# Patient Record
Sex: FEMALE | Race: WHITE
[De-identification: names, ages, dates, MRNs, and addresses within clinical notes are randomized per-mention and may not be internally consistent; named-entity substitution may affect disease eponyms.]

---

## 2022-11-08 ENCOUNTER — HOSPITAL ENCOUNTER (OUTPATIENT)
Age: 82
End: 2022-11-08
Payer: MEDICARE

## 2022-11-08 DIAGNOSIS — N95.0: Primary | ICD-10-CM

## 2022-11-08 PROCEDURE — 76830 TRANSVAGINAL US NON-OB: CPT

## 2023-05-02 RX ORDER — CARVEDILOL 6.25 MG/1
6.25 TABLET ORAL 2 TIMES DAILY WITH MEALS
Qty: 180 TABLET | Refills: 1 | Status: SHIPPED | OUTPATIENT
Start: 2023-05-02 | End: 2023-05-02 | Stop reason: SDUPTHER

## 2023-05-02 RX ORDER — CARVEDILOL 6.25 MG/1
6.25 TABLET ORAL 2 TIMES DAILY WITH MEALS
COMMUNITY
End: 2023-05-02 | Stop reason: SDUPTHER

## 2023-05-02 RX ORDER — CARVEDILOL 6.25 MG/1
6.25 TABLET ORAL 2 TIMES DAILY WITH MEALS
Qty: 180 TABLET | Refills: 1 | Status: SHIPPED | OUTPATIENT
Start: 2023-05-02

## 2024-05-02 ENCOUNTER — OFFICE VISIT (OUTPATIENT)
Dept: CARDIOLOGY | Facility: CLINIC | Age: 84
End: 2024-05-02
Payer: MEDICARE

## 2024-05-02 VITALS
DIASTOLIC BLOOD PRESSURE: 68 MMHG | SYSTOLIC BLOOD PRESSURE: 132 MMHG | BODY MASS INDEX: 19.81 KG/M2 | HEIGHT: 64 IN | OXYGEN SATURATION: 97 % | WEIGHT: 116 LBS | HEART RATE: 80 BPM

## 2024-05-02 DIAGNOSIS — R94.31 ABNORMAL ECG: ICD-10-CM

## 2024-05-02 DIAGNOSIS — I50.21 ACUTE SYSTOLIC CHF (CONGESTIVE HEART FAILURE): Primary | Chronic | ICD-10-CM

## 2024-05-02 DIAGNOSIS — I34.9 NONRHEUMATIC MITRAL VALVE DISORDER: Chronic | ICD-10-CM

## 2024-05-02 RX ORDER — ROSUVASTATIN CALCIUM 10 MG/1
10 TABLET, COATED ORAL DAILY
COMMUNITY

## 2024-05-02 RX ORDER — CARVEDILOL 12.5 MG/1
1 TABLET ORAL EVERY 12 HOURS SCHEDULED
COMMUNITY
Start: 2024-04-27

## 2024-05-02 RX ORDER — ALPRAZOLAM 0.5 MG/1
TABLET ORAL
COMMUNITY

## 2024-05-02 RX ORDER — MULTIPLE VITAMINS W/ MINERALS TAB 9MG-400MCG
1 TAB ORAL DAILY
COMMUNITY

## 2024-05-02 RX ORDER — HYDROCORTISONE 10 MG/1
TABLET ORAL
COMMUNITY
Start: 2024-04-27

## 2024-05-02 RX ORDER — HYDROXYZINE 50 MG/1
1 TABLET, FILM COATED ORAL EVERY 12 HOURS SCHEDULED
COMMUNITY
Start: 2024-02-26

## 2024-05-02 RX ORDER — METAPROTERENOL SULFATE 10 MG
500 TABLET ORAL DAILY
COMMUNITY

## 2024-05-02 RX ORDER — LISINOPRIL 2.5 MG/1
2.5 TABLET ORAL DAILY
COMMUNITY

## 2024-05-02 RX ORDER — FLUDROCORTISONE ACETATE 0.1 MG/1
1 TABLET ORAL DAILY
COMMUNITY
Start: 2024-04-05

## 2024-05-02 RX ORDER — BUSPIRONE HYDROCHLORIDE 5 MG/1
TABLET ORAL 2 TIMES DAILY
COMMUNITY

## 2024-05-02 NOTE — ASSESSMENT & PLAN NOTE
History of Systolic heart failure with LV EF 45-50% on ECHO 7/14/22.  Dr. Dorantes at [facility] PET scan showed possible worsening heart failure.  Repeat ECHO to follow up.

## 2024-05-02 NOTE — PROGRESS NOTES
[facility] Provider Note     Date:   2024  Name: Osiris Moody  :   1940  PCP: Sean Acevedo MD    Chief Complaint   Patient presents with   • Congestive Heart Failure       Subjective     History of Present Illness  Osiris Moody is a 84 y.o. female who presents today for CHF.    Patient states she does not remember why she is here. She was last seen by Dr. Moreno 2022. Her daughter is now helping her to remember her appointments.  She has been living with her grandson and her great grandson.  She was seen at [facility] after having a PET scan revealing that she may have worsening heart failure. She had her last radiation treatment for uterine cancer 23.  Patient states that she feels like the radiation affected her memory.  Her PCP has been refilling her medications.  She denies any exertional chest pain or shortness of breath.  Patient has a history of palpitations and irregular heartbeat.  We will plan to repeat an echo to check for her heart failure and a nuclear stress test.  Patient is unable to walk the treadmill due to age and muscle weakness.    Cardiac History:    ECHO 22 left ventricular ejection fraction estimated 45 to 50%.  Mild mitral valve regurgitation.  Mild aortic valve regurgitation.    Stress ECHO 21 low risk     Reports Denies   Chest Pain [] [x]   Shortness of Air [] [x]   Palpitations [] [x]   Edema [] [x]   Dizziness [] [x]   Syncope [] [x]       Allergies   Allergen Reactions   • Hydromorphone Other (See Comments)     Other reaction(s): Other - please document in the comment field   Pt's daughter reports pt experienced confusion and agitation.   **tolerates oxycodone 22**    Pt's daughter reports pt experienced confusion and agitation.   **tolerates oxycodone 22**   • Meperidine Unknown (See Comments)     Other reaction(s): Unknown - Patient states they do not know rxn details   • Morphine Unknown (See  Comments)     Other reaction(s): Unknown - Patient states they do not know rxn details   Derivatives   **tolerates oxycodone 12/19/22**    Derivatives   **tolerates oxycodone 12/19/22**       Current Outpatient Medications:   •  ALPRAZolam (XANAX) 0.5 MG tablet, TAKE 1/2 TO 1 (ONE-HALF TO ONE) TABLET BY MOUTH ONCE DAILY AS NEEDED FOR SEVERE ANXIETY, Disp: , Rfl:   •  busPIRone (BUSPAR) 5 MG tablet, Take  by mouth 2 (Two) Times a Day., Disp: , Rfl:   •  carvedilol (COREG) 12.5 MG tablet, Take 1 tablet by mouth Every 12 (Twelve) Hours., Disp: , Rfl:   •  escitalopram (LEXAPRO) 20 MG tablet, Take 1 tablet by mouth once daily, Disp: 30 tablet, Rfl: 0  •  fludrocortisone 0.1 MG tablet, Take 1 tablet by mouth Daily., Disp: , Rfl:   •  hydrocortisone (CORTEF) 10 MG tablet, TAKE 2 TABLETS BY MOUTH IN THE MORNING AND 1/2 (ONE-HALF) IN THE EVENING (INCREASE AS NEEDED FOR ILLNESS), Disp: , Rfl:   •  hydrOXYzine (ATARAX) 50 MG tablet, Take 1 tablet by mouth Every 12 (Twelve) Hours., Disp: , Rfl:   •  lisinopril (PRINIVIL,ZESTRIL) 2.5 MG tablet, Take 1 tablet by mouth Daily., Disp: , Rfl:   •  multivitamin with minerals tablet tablet, Take 1 tablet by mouth Daily., Disp: , Rfl:   •  Omega-3 Fatty Acids (Omega-3 Fish Oil) 500 MG capsule, Take 1 capsule by mouth Daily., Disp: , Rfl:   •  rosuvastatin (CRESTOR) 10 MG tablet, Take 1 tablet by mouth Daily., Disp: , Rfl:     Past Medical History:   Diagnosis Date   • Hector's disease    • Diverticulosis    • Fibrocystic breast disease    • Hyperlipidemia    • Hypertension       Past Surgical History:   Procedure Laterality Date   • COLON SURGERY       Family History   Problem Relation Age of Onset   • Brain cancer Sister    • Cancer Brother      Social History     Socioeconomic History   • Marital status:    Tobacco Use   • Smoking status: Never     Passive exposure: Never   • Smokeless tobacco: Never   Vaping Use   • Vaping status: Never Used   Substance and Sexual Activity  "  • Alcohol use: Never   • Drug use: Never   • Sexual activity: Defer       Objective     Vital Signs:  /68   Pulse 80   Ht 162.6 cm (64\")   Wt 52.6 kg (116 lb)   SpO2 97%   BMI 19.91 kg/m²   Estimated body mass index is 19.91 kg/m² as calculated from the following:    Height as of this encounter: 162.6 cm (64\").    Weight as of this encounter: 52.6 kg (116 lb).       BMI is within normal parameters. No other follow-up for BMI required.      Physical Exam  Constitutional:       Appearance: Normal appearance.   Cardiovascular:      Rate and Rhythm: Normal rate. Rhythm regularly irregular.      Pulses: Normal pulses.      Heart sounds: Normal heart sounds.   Pulmonary:      Effort: Pulmonary effort is normal.      Breath sounds: Normal breath sounds.   Musculoskeletal:         General: Normal range of motion.      Right lower leg: No edema.      Left lower leg: No edema.   Skin:     General: Skin is warm and dry.      Capillary Refill: Capillary refill takes less than 2 seconds.   Neurological:      General: No focal deficit present.      Mental Status: She is alert and oriented to person, place, and time.   Psychiatric:         Mood and Affect: Mood normal.         Behavior: Behavior normal.         The following data was reviewed by: JUSTEN Nava on 05/02/2024:   Labs CBC and CMP have been reviewed.  Last OV note 3/27/24 Dr. Sahra Dorantes [facility] has been reviewed.  7/14/22 Last OV note Dr. Moreno was reviewed.   ECHO 7/14/22 and Stress ECHO  8/23/21 Reviewed.      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 Acute systolic CHF (congestive heart failure) (Primary)  Assessment & Plan:  History of Systolic heart failure with LV EF 45-50% on ECHO 7/14/22.  Dr. Dorantes at [facility] PET scan showed possible worsening heart failure.  Repeat ECHO to follow up.        Orders:  -     ECG 12 Lead  -     Adult Transthoracic Echo Complete W/ Cont if Necessary Per Protocol; Future  -     Stress Test With Myocardial Perfusion One Day; Future    2. 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  Will plan to repeat ECHO and Nuclear stress test.    Orders:  -     Stress Test With Myocardial Perfusion One Day; Future    3. Nonrheumatic mitral valve disorder  Assessment & Plan:  Mild Mitral Valve on ECHO in 2022. Plan to recheck ECHO.    Orders:  -     Adult Transthoracic Echo Complete W/ Cont if Necessary Per Protocol; Future        Recommendations: ER if symptoms increase, Report if any new/changing symptoms immediately, and Limit salt          Follow Up  Return in about 4 weeks (around 5/30/2024) for CHF.  Patient was given instructions and counseling regarding her condition or for health maintenance advice. Please see specific information pulled into the AVS if appropriate.

## 2024-05-02 NOTE — ASSESSMENT & PLAN NOTE
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  Will plan to repeat ECHO and Nuclear stress test.

## 2024-05-16 ENCOUNTER — OUTSIDE FACILITY SERVICE (OUTPATIENT)
Dept: CARDIOLOGY | Facility: CLINIC | Age: 84
End: 2024-05-16
Payer: MEDICARE

## 2024-05-17 ENCOUNTER — HOSPITAL ENCOUNTER (OUTPATIENT)
Dept: CARDIOLOGY | Facility: HOSPITAL | Age: 84
Discharge: HOME | End: 2024-05-17

## 2024-05-17 DIAGNOSIS — I50.21 ACUTE SYSTOLIC CHF (CONGESTIVE HEART FAILURE): ICD-10-CM

## 2024-05-17 DIAGNOSIS — R94.31 ABNORMAL ECG: ICD-10-CM

## 2024-05-21 ENCOUNTER — TELEPHONE (OUTPATIENT)
Dept: CARDIOLOGY | Facility: CLINIC | Age: 84
End: 2024-05-21
Payer: MEDICARE

## 2024-06-06 ENCOUNTER — OFFICE VISIT (OUTPATIENT)
Dept: CARDIOLOGY | Facility: CLINIC | Age: 84
End: 2024-06-06
Payer: MEDICARE

## 2024-06-06 VITALS
DIASTOLIC BLOOD PRESSURE: 74 MMHG | SYSTOLIC BLOOD PRESSURE: 138 MMHG | HEART RATE: 80 BPM | HEIGHT: 64 IN | WEIGHT: 116 LBS | OXYGEN SATURATION: 97 % | BODY MASS INDEX: 19.81 KG/M2

## 2024-06-06 DIAGNOSIS — R94.39 ABNORMAL NUCLEAR STRESS TEST: Primary | ICD-10-CM

## 2024-06-06 DIAGNOSIS — I50.22 CHRONIC SYSTOLIC HEART FAILURE: ICD-10-CM

## 2024-06-06 PROCEDURE — 99214 OFFICE O/P EST MOD 30 MIN: CPT | Performed by: NURSE PRACTITIONER

## 2024-06-06 PROCEDURE — 1159F MED LIST DOCD IN RCRD: CPT | Performed by: NURSE PRACTITIONER

## 2024-06-06 PROCEDURE — 1160F RVW MEDS BY RX/DR IN RCRD: CPT | Performed by: NURSE PRACTITIONER

## 2024-06-06 NOTE — ASSESSMENT & PLAN NOTE
She completed an echocardiogram today that revealed an LVEF of 41-45%, grade 1A diastolic dysfunction and mild pulmonary hypertension.  Her previous EF was 45% in 2022.  Currently stable and euvolemic on exam today.  - Continue current medical therapy

## 2024-06-06 NOTE — ASSESSMENT & PLAN NOTE
She completed a nuclear stress test on 5/17/2024 that revealed evidence of anteroseptal scarring with mild surrounding ischemia, intermediate-high risk study.  We discussed the results of heart cath in detail.  We also discussed further cardiac testing, including left heart cath, including the risk and benefits.  Patient does not want to proceed with further testing at this time.  She denies any current symptoms.  She denies chest pain, shortness of breath, palpitations, dizziness or syncope.  [place] and denies any exertional chest pain or significant shortness of breath.      -Close follow-up in 3 months to reassess symptoms  - She will call and let me know if she changes her mind about the heart cath.

## 2024-06-06 NOTE — PROGRESS NOTES
[facility] Provider Note     Date:   2024   Name: Osiris Moody  :   1940  PCP: Sean Acevedo MD    Chief Complaint   Patient presents with    Follow-up     Patient here for follow up with estiven, echo results.        Subjective     History of Present Illness  Osiris Moody is a 84 y.o. female who presents today for follow-up on CHF and recent cardiac testing.  A cardiac workup was ordered at last office visit on 2024 due to shortness of breath and concern for worsening heart failure.  She completed an echocardiogram today that revealed an LVEF of 41-45%, grade 1A diastolic dysfunction and mild pulmonary hypertension.  Her previous EF was 45% in .  She completed treatment for uterine cancer last year.  She completed a nuclear stress test on 2024 that revealed evidence of anteroseptal scarring with mild surrounding ischemia, intermediate-high risk study.  We discussed the results of heart cath in detail.  Patient denies any current symptoms.  She denies chest pain, shortness of breath, palpitations, dizziness or syncope.  [place] and denies any exertional chest pain or significant shortness of breath.  She denies any new symptoms or concerns today.    Cardiac History:  1. Heart Failure  2. HTN  3. HLD  4. Abnormal Stress test 24- evidence of anteroseptal scarring with mild surrounding ischemia. Intermediate-high risk study. Declined heart cath    Echocardiogram 24- LVEF 41-45%. Diastolic function consistent with (grade 1a w/high LAP) impaired relaxation. Mild pulmonary HTN.     Nuclear stress test 24- evidence of anteroseptal scarring with mild surrounding ischemia. Intermediate-high risk study. Declined heart cath     Reports Denies   Chest Pain [] [x]   Shortness of Air [] [x]   Palpitations [] [x]   Edema [] [x]   Dizziness [] [x]   Syncope [] [x]       Allergies   Allergen Reactions    Hydromorphone Other  (See Comments)     Other reaction(s): Other - please document in the comment field   Pt's daughter reports pt experienced confusion and agitation.   **tolerates oxycodone 12/19/22**    Pt's daughter reports pt experienced confusion and agitation.   **tolerates oxycodone 12/19/22**    Meperidine Unknown (See Comments)     Other reaction(s): Unknown - Patient states they do not know rxn details    Morphine Unknown (See Comments)     Other reaction(s): Unknown - Patient states they do not know rxn details   Derivatives   **tolerates oxycodone 12/19/22**    Derivatives   **tolerates oxycodone 12/19/22**       Current Outpatient Medications:     ALPRAZolam (XANAX) 0.5 MG tablet, TAKE 1/2 TO 1 (ONE-HALF TO ONE) TABLET BY MOUTH ONCE DAILY AS NEEDED FOR SEVERE ANXIETY, Disp: , Rfl:     busPIRone (BUSPAR) 5 MG tablet, Take  by mouth 2 (Two) Times a Day., Disp: , Rfl:     carvedilol (COREG) 12.5 MG tablet, Take 1 tablet by mouth Every 12 (Twelve) Hours., Disp: , Rfl:     escitalopram (LEXAPRO) 20 MG tablet, Take 1 tablet by mouth once daily, Disp: 30 tablet, Rfl: 0    fludrocortisone 0.1 MG tablet, Take 1 tablet by mouth Daily., Disp: , Rfl:     hydrocortisone (CORTEF) 10 MG tablet, TAKE 2 TABLETS BY MOUTH IN THE MORNING AND 1/2 (ONE-HALF) IN THE EVENING (INCREASE AS NEEDED FOR ILLNESS), Disp: , Rfl:     hydrOXYzine (ATARAX) 50 MG tablet, Take 1 tablet by mouth Every 12 (Twelve) Hours., Disp: , Rfl:     lisinopril (PRINIVIL,ZESTRIL) 2.5 MG tablet, Take 1 tablet by mouth Daily., Disp: , Rfl:     multivitamin with minerals tablet tablet, Take 1 tablet by mouth Daily., Disp: , Rfl:     Omega-3 Fatty Acids (Omega-3 Fish Oil) 500 MG capsule, Take 1 capsule by mouth Daily., Disp: , Rfl:     rosuvastatin (CRESTOR) 10 MG tablet, Take 1 tablet by mouth Daily., Disp: , Rfl:     Past Medical History:   Diagnosis Date    Wallace's disease     Diverticulosis     Fibrocystic breast disease     Hyperlipidemia     Hypertension       Past  "Surgical History:   Procedure Laterality Date    COLON SURGERY       Family History   Problem Relation Age of Onset    Brain cancer Sister     Cancer Brother      Social History     Socioeconomic History    Marital status:    Tobacco Use    Smoking status: Never     Passive exposure: Never    Smokeless tobacco: Never   Vaping Use    Vaping status: Never Used   Substance and Sexual Activity    Alcohol use: Never    Drug use: Never    Sexual activity: Defer       Objective     Vital Signs:  /74 (BP Location: Right arm, Patient Position: Sitting, Cuff Size: Adult)   Pulse 80   Ht 162.6 cm (64\")   Wt 52.6 kg (116 lb)   SpO2 97%   BMI 19.91 kg/m²   Estimated body mass index is 19.91 kg/m² as calculated from the following:    Height as of this encounter: 162.6 cm (64\").    Weight as of this encounter: 52.6 kg (116 lb).       BMI is within normal parameters. No other follow-up for BMI required.      Physical Exam  Vitals reviewed.   Constitutional:       Appearance: Normal appearance.   HENT:      Head: Normocephalic.   Cardiovascular:      Rate and Rhythm: Normal rate and regular rhythm.      Heart sounds: Normal heart sounds.   Pulmonary:      Effort: Pulmonary effort is normal.      Breath sounds: Normal breath sounds.   Musculoskeletal:      Right lower leg: No edema.      Left lower leg: No edema.   Skin:     General: Skin is warm and dry.      Capillary Refill: Capillary refill takes less than 2 seconds.   Neurological:      General: No focal deficit present.      Mental Status: She is alert and oriented to person, place, and time.   Psychiatric:         Mood and Affect: Mood normal.         Behavior: Behavior normal.         Cardiology studies reviewed: Stress test and echo reviewed         Assessment and Plan     Diagnoses and all orders for this visit:    1. Abnormal nuclear stress test (Primary)  Assessment & Plan:   She completed a nuclear stress test on 5/17/2024 that revealed evidence of " anteroseptal scarring with mild surrounding ischemia, intermediate-high risk study.  We discussed the results of heart cath in detail.  We also discussed further cardiac testing, including left heart cath, including the risk and benefits.  Patient does not want to proceed with further testing at this time.  She denies any current symptoms.  She denies chest pain, shortness of breath, palpitations, dizziness or syncope.  [place] and denies any exertional chest pain or significant shortness of breath.      -Close follow-up in 3 months to reassess symptoms  - She will call and let me know if she changes her mind about the heart cath.      2. Chronic systolic heart failure  Assessment & Plan:  She completed an echocardiogram today that revealed an LVEF of 41-45%, grade 1A diastolic dysfunction and mild pulmonary hypertension.  Her previous EF was 45% in 2022.  Currently stable and euvolemic on exam today.  - Continue current medical therapy          Recommendations: ER if symptoms increase and Report if any new/changing symptoms immediately          Follow Up  Return in about 3 months (around 9/6/2024) for Abnormal stress test. .  Patient was given instructions and counseling regarding her condition or for health maintenance advice. Please see specific information pulled into the AVS if appropriate.

## 2024-09-05 ENCOUNTER — OFFICE VISIT (OUTPATIENT)
Dept: CARDIOLOGY | Facility: CLINIC | Age: 84
End: 2024-09-05
Payer: MEDICARE

## 2024-09-05 VITALS
DIASTOLIC BLOOD PRESSURE: 80 MMHG | HEART RATE: 66 BPM | SYSTOLIC BLOOD PRESSURE: 136 MMHG | HEIGHT: 64 IN | WEIGHT: 117 LBS | BODY MASS INDEX: 19.97 KG/M2 | OXYGEN SATURATION: 96 %

## 2024-09-05 DIAGNOSIS — I50.22 CHRONIC SYSTOLIC HEART FAILURE: Chronic | ICD-10-CM

## 2024-09-05 DIAGNOSIS — I34.9 NONRHEUMATIC MITRAL VALVE DISORDER: Chronic | ICD-10-CM

## 2024-09-05 DIAGNOSIS — E78.5 HYPERLIPIDEMIA LDL GOAL <100: ICD-10-CM

## 2024-09-05 DIAGNOSIS — I25.810 CORONARY ARTERY DISEASE INVOLVING CORONARY BYPASS GRAFT OF NATIVE HEART WITHOUT ANGINA PECTORIS: Primary | ICD-10-CM

## 2024-09-05 DIAGNOSIS — R94.39 ABNORMAL NUCLEAR STRESS TEST: ICD-10-CM

## 2024-09-05 RX ORDER — ROSUVASTATIN CALCIUM 20 MG/1
20 TABLET, COATED ORAL DAILY
Qty: 90 TABLET | Refills: 3 | Status: SHIPPED | OUTPATIENT
Start: 2024-09-05

## 2024-09-05 RX ORDER — ASPIRIN 81 MG/1
81 TABLET ORAL DAILY
Qty: 90 TABLET | Refills: 4 | Status: SHIPPED | OUTPATIENT
Start: 2024-09-05

## 2024-09-05 RX ORDER — HYDROCODONE BITARTRATE AND ACETAMINOPHEN 5; 325 MG/1; MG/1
1 TABLET ORAL EVERY 12 HOURS SCHEDULED
COMMUNITY
Start: 2024-08-27

## 2024-09-05 RX ORDER — TRAMADOL HYDROCHLORIDE 50 MG/1
50 TABLET ORAL EVERY 6 HOURS PRN
COMMUNITY
Start: 2024-08-26

## 2024-09-05 NOTE — ASSESSMENT & PLAN NOTE
Echo 6/6/2024 showed mild mitral valve regurgitation is present with a posteriorly-directed jet noted.

## 2024-09-05 NOTE — PROGRESS NOTES
[facility] Provider Note     Date:   2024  Name: Osiris Moody  :   1940  PCP: Sean Acevedo MD    Chief Complaint   Patient presents with    Acute systolic CHF (congestive heart failure)       Subjective     History of Present Illness  Osiris Moody is a 84 y.o. female who presents today for CHF/CAD.    Patient is here with her daughter because she says she is very hard of hearing.  She states that she did not hear at her last visit that she had an abnormal stress test.  We have discussed the stress test and patient has decided not to proceed with a heart cath.  We will increase her statin and add aspirin EC 81 mg.  Patient states she has been doing overall very well.  Her daughter says that the patient climb 2 flights of steps to her apartment the other day because the elevator was out and did not have difficulty.  Patient has no significant CV complaints today.  Patient denies any chest pain, shortness of air, palpitations, edema, presyncope, or syncope.    Cardiac History:  1. Heart Failure  2. HTN  3. HLD  4. Abnormal Stress test 24- evidence of anteroseptal scarring with mild surrounding ischemia. Intermediate-high risk study. Declined heart cath    Echocardiogram 24- LVEF 41-45%. Diastolic function consistent with (grade 1a w/high LAP) impaired relaxation. Mild pulmonary HTN. Mild mitral valve regurgitation is present with a posteriorly-directed jet noted.        Reports Denies   Chest Pain [] [x]   Shortness of Air [] [x]   Palpitations [] [x]   Edema [] [x]   Dizziness [] [x]   Syncope [] [x]         Allergies   Allergen Reactions    Hydromorphone Other (See Comments)     Other reaction(s): Other - please document in the comment field   Pt's daughter reports pt experienced confusion and agitation.   **tolerates oxycodone 22**    Pt's daughter reports pt experienced confusion and agitation.   **tolerates oxycodone 22**    Meperidine Unknown  (See Comments)     Other reaction(s): Unknown - Patient states they do not know rxn details    Morphine Unknown (See Comments)     Other reaction(s): Unknown - Patient states they do not know rxn details   Derivatives   **tolerates oxycodone 12/19/22**    Derivatives   **tolerates oxycodone 12/19/22**       Current Outpatient Medications:     ALPRAZolam (XANAX) 0.5 MG tablet, TAKE 1/2 TO 1 (ONE-HALF TO ONE) TABLET BY MOUTH ONCE DAILY AS NEEDED FOR SEVERE ANXIETY, Disp: , Rfl:     busPIRone (BUSPAR) 5 MG tablet, Take  by mouth 2 (Two) Times a Day., Disp: , Rfl:     carvedilol (COREG) 12.5 MG tablet, Take 1 tablet by mouth Every 12 (Twelve) Hours., Disp: , Rfl:     escitalopram (LEXAPRO) 20 MG tablet, Take 1 tablet by mouth once daily, Disp: 30 tablet, Rfl: 0    fludrocortisone 0.1 MG tablet, Take 1 tablet by mouth Daily., Disp: , Rfl:     HYDROcodone-acetaminophen (NORCO) 5-325 MG per tablet, Take 1 tablet by mouth Every 12 (Twelve) Hours., Disp: , Rfl:     hydrocortisone (CORTEF) 10 MG tablet, TAKE 2 TABLETS BY MOUTH IN THE MORNING AND 1/2 (ONE-HALF) IN THE EVENING (INCREASE AS NEEDED FOR ILLNESS), Disp: , Rfl:     hydrOXYzine (ATARAX) 50 MG tablet, Take 1 tablet by mouth Every 12 (Twelve) Hours., Disp: , Rfl:     lisinopril (PRINIVIL,ZESTRIL) 2.5 MG tablet, Take 1 tablet by mouth Daily., Disp: , Rfl:     traMADol (ULTRAM) 50 MG tablet, Take 1 tablet by mouth Every 6 (Six) Hours As Needed. for pain, Disp: , Rfl:     aspirin 81 MG EC tablet, Take 1 tablet by mouth Daily., Disp: 90 tablet, Rfl: 4    rosuvastatin (CRESTOR) 20 MG tablet, Take 1 tablet by mouth Daily., Disp: 90 tablet, Rfl: 3    Past Medical History:   Diagnosis Date    Onslow's disease     Diverticulosis     Fibrocystic breast disease     Hyperlipidemia     Hypertension       Past Surgical History:   Procedure Laterality Date    COLON SURGERY       Family History   Problem Relation Age of Onset    Brain cancer Sister     Cancer Brother      Social  "History     Socioeconomic History    Marital status:    Tobacco Use    Smoking status: Never     Passive exposure: Never    Smokeless tobacco: Never   Vaping Use    Vaping status: Never Used   Substance and Sexual Activity    Alcohol use: Never    Drug use: Never    Sexual activity: Defer       Objective     Vital Signs:  /80   Pulse 66   Ht 162.6 cm (64\")   Wt 53.1 kg (117 lb)   SpO2 96%   BMI 20.08 kg/m²   Estimated body mass index is 20.08 kg/m² as calculated from the following:    Height as of this encounter: 162.6 cm (64\").    Weight as of this encounter: 53.1 kg (117 lb).       BMI is within normal parameters. No other follow-up for BMI required.      Physical Exam  Constitutional:       Appearance: Normal appearance.   Cardiovascular:      Rate and Rhythm: Normal rate and regular rhythm.      Pulses: Normal pulses.      Heart sounds: Normal heart sounds.   Pulmonary:      Effort: Pulmonary effort is normal.      Breath sounds: Normal breath sounds.   Musculoskeletal:         General: Normal range of motion.      Right lower leg: No edema.      Left lower leg: No edema.   Skin:     General: Skin is warm and dry.      Capillary Refill: Capillary refill takes less than 2 seconds.   Neurological:      General: No focal deficit present.      Mental Status: She is alert and oriented to person, place, and time.   Psychiatric:         Mood and Affect: Mood normal.         Behavior: Behavior normal.         Thought Content: Thought content normal.         Judgment: Judgment normal.                       Assessment and Plan     Diagnoses and all orders for this visit:    1. Coronary artery disease involving coronary bypass graft of native heart without angina pectoris (Primary)  -     aspirin 81 MG EC tablet; Take 1 tablet by mouth Daily.  Dispense: 90 tablet; Refill: 4  -     rosuvastatin (CRESTOR) 20 MG tablet; Take 1 tablet by mouth Daily.  Dispense: 90 tablet; Refill: 3    2. Hyperlipidemia LDL " goal <100    3. Chronic systolic heart failure  Assessment & Plan:  Echo 6/6/2024 showed LVEF 41-45%, with a diastolic function consistent with grade 1A WF/high LAP impaired relaxation.    Continue current medication.          4. Abnormal nuclear stress test  Assessment & Plan:  Abnormal nuclear stress test was discussed with patient and her daughter.  Patient stated that she was very hard of hearing and at her last visit she did not hear everything that the provider had to say.  I have discussed with daughter and patient about the abnormal stress test and if the patient would want any intervention.  The daughter and patient have both verbalized that they do not wish to proceed with a heart cath.  Patient denies any chest pain, shortness of breath, edema, presyncope, or syncope.    Follow-up in 6 months or sooner if any changes in symptoms.    Inc. creased rosuvastatin to 20 mg daily and added aspirin 81 mg daily.      5. Nonrheumatic mitral valve disorder  Assessment & Plan:  Echo 6/6/2024 showed mild mitral valve regurgitation is present with a posteriorly-directed jet noted.           Recommendations: ER if symptoms increase, Report if any new/changing symptoms immediately, and Limit salt          Follow Up  Return in about 6 months (around 3/5/2025) for CAD, hyperlipidemia.  Patient was given instructions and counseling regarding her condition or for health maintenance advice. Please see specific information pulled into the AVS if appropriate.

## 2024-09-05 NOTE — ASSESSMENT & PLAN NOTE
Abnormal nuclear stress test was discussed with patient and her daughter.  Patient stated that she was very hard of hearing and at her last visit she did not hear everything that the provider had to say.  I have discussed with daughter and patient about the abnormal stress test and if the patient would want any intervention.  The daughter and patient have both verbalized that they do not wish to proceed with a heart cath.  Patient denies any chest pain, shortness of breath, edema, presyncope, or syncope.    Follow-up in 6 months or sooner if any changes in symptoms.    Inc. creased rosuvastatin to 20 mg daily and added aspirin 81 mg daily.

## 2024-09-05 NOTE — ASSESSMENT & PLAN NOTE
Echo 6/6/2024 showed LVEF 41-45%, with a diastolic function consistent with grade 1A WF/high LAP impaired relaxation.    Continue current medication.

## 2025-03-06 ENCOUNTER — OFFICE VISIT (OUTPATIENT)
Dept: CARDIOLOGY | Facility: CLINIC | Age: 85
End: 2025-03-06
Payer: MEDICARE

## 2025-03-06 VITALS
DIASTOLIC BLOOD PRESSURE: 64 MMHG | OXYGEN SATURATION: 97 % | HEART RATE: 79 BPM | WEIGHT: 117 LBS | SYSTOLIC BLOOD PRESSURE: 112 MMHG | HEIGHT: 64 IN | BODY MASS INDEX: 19.97 KG/M2

## 2025-03-06 DIAGNOSIS — I34.9 NONRHEUMATIC MITRAL VALVE DISORDER: ICD-10-CM

## 2025-03-06 DIAGNOSIS — I50.22 CHRONIC SYSTOLIC HEART FAILURE: Primary | Chronic | ICD-10-CM

## 2025-03-06 DIAGNOSIS — E78.5 HYPERLIPIDEMIA LDL GOAL <100: ICD-10-CM

## 2025-03-06 PROCEDURE — 99214 OFFICE O/P EST MOD 30 MIN: CPT | Performed by: NURSE PRACTITIONER

## 2025-03-06 PROCEDURE — 1160F RVW MEDS BY RX/DR IN RCRD: CPT | Performed by: NURSE PRACTITIONER

## 2025-03-06 PROCEDURE — 1159F MED LIST DOCD IN RCRD: CPT | Performed by: NURSE PRACTITIONER

## 2025-03-06 RX ORDER — POTASSIUM CHLORIDE 750 MG/1
10 TABLET, EXTENDED RELEASE ORAL DAILY
Qty: 90 TABLET | Refills: 3 | Status: SHIPPED | OUTPATIENT
Start: 2025-03-06

## 2025-03-06 RX ORDER — FUROSEMIDE 20 MG/1
20 TABLET ORAL DAILY
Qty: 90 TABLET | Refills: 3 | Status: SHIPPED | OUTPATIENT
Start: 2025-03-06

## 2025-03-06 RX ORDER — LOSARTAN POTASSIUM 50 MG/1
50 TABLET ORAL DAILY
COMMUNITY

## 2025-03-06 NOTE — ASSESSMENT & PLAN NOTE
Lipid abnormalities are stable    Plan:  Continue same medication/s without change.      Discussed medication dosage, use, side effects, and goals of treatment in detail.    Counseled patient on lifestyle modifications to help control hyperlipidemia.     Patient Treatment Goals:   LDL goal is less than 70    Followup in 6 months.    Patient to continue rosuvastatin.

## 2025-03-06 NOTE — ASSESSMENT & PLAN NOTE
Plan to recheck echo in June.  Echo 6/6/2024 LVEF 41 to 45%, diastolic function consistent with grade 1A w/high LAP impaired relaxation.    Patient needs prescription for Lasix and potassium.  She has been instructed to weigh herself daily to monitor for any weight changes.  Limit salt intake  Increase activity as tolerated.

## 2025-03-06 NOTE — PROGRESS NOTES
[facility] Provider Note     Date:   2025  Name: Osiris Moody  :   1940  PCP: Sean Acevedo MD    Chief Complaint   Patient presents with    Follow-up     6 mo follow up CAD/HLD       Subjective     History of Present Illness  Osiris Moody is a 85 y.o. female who presents today for follow up CAD/HLD.    Patient is here today with her daughter.  Patient states she has been doing very well except for her back pain.  She states that her daughter states with her through the week but has to leave and go back with her family for the weekend.  She states that she has some really bad depression when she is alone.  Patient reports that she is starting therapy with the psychologist this next week.  Patient's daughter states that when she was in the hospital for heart failure she was on Lasix and potassium when she discharged, and she has no refills on it and wants to know if I can refill her medication as well as the potassium.  Patient states she does not weigh herself daily but does notice from time to time her pants feeling tight.  Patient has been instructed to weigh herself daily and limit her salt intake.  Patient has no significant CV complaints today.  She denies any chest pain, shortness of breath, edema, presyncope, or syncope.  Patient states she has been in the ER once in January and again in February for having the flu.  Those records have been obtained and reviewed.    Cardiac History:  1. Heart Failure  2. HTN  3. HLD  4. Abnormal Stress test 24- evidence of anteroseptal scarring with mild surrounding ischemia. Intermediate-high risk study. Declined heart cath    Echocardiogram 24- LVEF 41-45%. Diastolic function consistent with (grade 1a w/high LAP) impaired relaxation. Mild pulmonary HTN. Mild mitral valve regurgitation is present with a posteriorly-directed jet noted.        Reports Denies   Chest Pain [] [x]   Shortness of Air [] [x]   Palpitations []  [x]   Edema [] [x]   Dizziness [] [x]   Syncope [] [x]         Allergies   Allergen Reactions    Hydromorphone Other (See Comments)     Other reaction(s): Other - please document in the comment field   Pt's daughter reports pt experienced confusion and agitation.   **tolerates oxycodone 12/19/22**    Pt's daughter reports pt experienced confusion and agitation.   **tolerates oxycodone 12/19/22**    Meperidine Unknown (See Comments)     Other reaction(s): Unknown - Patient states they do not know rxn details    Morphine Unknown (See Comments)     Other reaction(s): Unknown - Patient states they do not know rxn details   Derivatives   **tolerates oxycodone 12/19/22**    Derivatives   **tolerates oxycodone 12/19/22**       Current Outpatient Medications:     ALPRAZolam (XANAX) 0.5 MG tablet, TAKE 1/2 TO 1 (ONE-HALF TO ONE) TABLET BY MOUTH ONCE DAILY AS NEEDED FOR SEVERE ANXIETY, Disp: , Rfl:     aspirin 81 MG EC tablet, Take 1 tablet by mouth Daily., Disp: 90 tablet, Rfl: 4    busPIRone (BUSPAR) 5 MG tablet, Take  by mouth 2 (Two) Times a Day., Disp: , Rfl:     carvedilol (COREG) 12.5 MG tablet, Take 1 tablet by mouth Every 12 (Twelve) Hours., Disp: , Rfl:     escitalopram (LEXAPRO) 20 MG tablet, Take 1 tablet by mouth once daily, Disp: 30 tablet, Rfl: 0    fludrocortisone 0.1 MG tablet, Take 1 tablet by mouth Daily., Disp: , Rfl:     hydrocortisone (CORTEF) 10 MG tablet, TAKE 2 TABLETS BY MOUTH IN THE MORNING AND 1/2 (ONE-HALF) IN THE EVENING (INCREASE AS NEEDED FOR ILLNESS), Disp: , Rfl:     hydrOXYzine (ATARAX) 50 MG tablet, Take 1 tablet by mouth Every 12 (Twelve) Hours., Disp: , Rfl:     losartan (COZAAR) 50 MG tablet, Take 1 tablet by mouth Daily., Disp: , Rfl:     rosuvastatin (CRESTOR) 20 MG tablet, Take 1 tablet by mouth Daily., Disp: 90 tablet, Rfl: 3    traMADol (ULTRAM) 50 MG tablet, Take 1 tablet by mouth Every 6 (Six) Hours As Needed. for pain, Disp: , Rfl:     furosemide (LASIX) 20 MG tablet, Take 1  "tablet by mouth Daily., Disp: 90 tablet, Rfl: 3    potassium chloride 10 MEQ CR tablet, Take 1 tablet by mouth Daily., Disp: 90 tablet, Rfl: 3    Past Medical History:   Diagnosis Date    Hector's disease     Diverticulosis     Fibrocystic breast disease     Hyperlipidemia     Hypertension       Past Surgical History:   Procedure Laterality Date    COLON SURGERY       Family History   Problem Relation Age of Onset    Brain cancer Sister     Cancer Brother      Social History     Socioeconomic History    Marital status:    Tobacco Use    Smoking status: Never     Passive exposure: Never    Smokeless tobacco: Never   Vaping Use    Vaping status: Never Used   Substance and Sexual Activity    Alcohol use: Never    Drug use: Never    Sexual activity: Defer       Objective     Vital Signs:  /64 (BP Location: Right arm, Patient Position: Sitting, Cuff Size: Adult)   Pulse 79   Ht 162.6 cm (64\")   Wt 53.1 kg (117 lb)   SpO2 97%   BMI 20.08 kg/m²   Estimated body mass index is 20.08 kg/m² as calculated from the following:    Height as of this encounter: 162.6 cm (64\").    Weight as of this encounter: 53.1 kg (117 lb).       BMI is within normal parameters. No other follow-up for BMI required.      Physical Exam  Constitutional:       Appearance: Normal appearance.   Cardiovascular:      Rate and Rhythm: Normal rate and regular rhythm.      Pulses: Normal pulses.      Heart sounds: Normal heart sounds.   Pulmonary:      Effort: Pulmonary effort is normal.      Breath sounds: Normal breath sounds.   Musculoskeletal:         General: Normal range of motion.      Right lower leg: No edema.      Left lower leg: No edema.   Skin:     General: Skin is warm and dry.      Capillary Refill: Capillary refill takes less than 2 seconds.   Neurological:      General: No focal deficit present.      Mental Status: She is alert and oriented to person, place, and time.   Psychiatric:         Mood and Affect: Mood normal.    "      Behavior: Behavior normal.         Thought Content: Thought content normal.         Judgment: Judgment normal.         The following data was reviewed by: JUSTEN Nava on 03/06/2025:    CBC with Auto Diff   Result Value Ref Range   WBC 4.6 (L) 4.8 - 10.8 K/µL   RBC 3.85 3.50 - 5.20 M/µL   Hemoglobin 11.4 (L) 11.7 - 15.8 GM/DL   Hematocrit 35.7 35.0 - 47.0 %   MCV 93 81 - 101 fL   MCH 29.6 27.0 - 34.0 pg   MCHC 31.9 (L) 32.0 - 36.0 GM/DL   RDW 14.0 11.5 - 14.5 %   Platelets 116 (L) 150 - 400 K/CU MM   MPV 9.4 9.4 - 12.4 fL   Nucleated Red Blood Cell 0.0 0 - 0.2 %   NRBC Absolute <0.01 0 - 0.012 K/ul   Comprehensive metabolic panel   Result Value Ref Range   Sodium 144 136 - 145 meq/L   Potassium 4.3 3.5 - 5.1 meq/L   Chloride 108 (H) 98 - 107 meq/L   CO2 32 21 - 32 meq/L   Calcium 8.5 8.5 - 10.1 mg/dL   Glucose 110 (H) 70 - 99 mg/dL   BUN 17 7 - 18 mg/dL   Creatinine 1.07 0.55 - 1.10 mg/dL   BUN/Creatinine 16   Albumin 2.6 (L) 3.4 - 5.0 g/dL   Alkaline Phosphatase 73 46 - 116 U/L   ALT 25 12 - 78 U/L   AST 25 15 - 37 U/L   Total Bilirubin 0.3 0.2 - 1.0 mg/dL   Protein, Total 5.8 (L) 6.4 - 8.2 gm/dL   Anion Gap 8 (L) 11 - 22   A/G Ratio 0.8   Globulin 3.2 g/dL   Osmolality Calc 289.0   eGFR (mL/min/1.73m2) 51 (L) >=60 mL/min/1.73m2   Lipase   Result Value Ref Range   Lipase 27 16 - 77 U/L     [facility] visit 1/31/2025 has been reviewed    [facility] ER visit 2/4/2025 has been reviewed.          Assessment and Plan     Diagnoses and all orders for this visit:    1. Chronic systolic heart failure (Primary)  Assessment & Plan:  Plan to recheck echo in June.  Echo 6/6/2024 LVEF 41 to 45%, diastolic function consistent with grade 1A w/high LAP impaired relaxation.    Patient needs prescription for Lasix and potassium.  She has been instructed to weigh herself daily to monitor for any weight changes.  Limit salt intake  Increase activity as tolerated.    Orders:  -     furosemide  (LASIX) 20 MG tablet; Take 1 tablet by mouth Daily.  Dispense: 90 tablet; Refill: 3  -     potassium chloride 10 MEQ CR tablet; Take 1 tablet by mouth Daily.  Dispense: 90 tablet; Refill: 3  -     Adult Transthoracic Echo Complete W/ Cont if Necessary Per Protocol; Future    2. Nonrheumatic mitral valve disorder  Assessment & Plan:  Mild mitral valve with a posteriorly-directed jet noted on echo 6/6/2024.    Plan to recheck echo in June.      3. Hyperlipidemia LDL goal <100  Assessment & Plan:   Lipid abnormalities are stable    Plan:  Continue same medication/s without change.      Discussed medication dosage, use, side effects, and goals of treatment in detail.    Counseled patient on lifestyle modifications to help control hyperlipidemia.     Patient Treatment Goals:   LDL goal is less than 70    Followup in 6 months.    Patient to continue rosuvastatin.          Recommendations: ER if symptoms increase, Report if any new/changing symptoms immediately, and Limit salt          Follow Up  Return in about 6 months (around 9/6/2025) for CHF.  Patient was given instructions and counseling regarding her condition or for health maintenance advice. Please see specific information pulled into the AVS if appropriate.

## 2025-03-06 NOTE — ASSESSMENT & PLAN NOTE
Mild mitral valve with a posteriorly-directed jet noted on echo 6/6/2024.    Plan to recheck echo in June.

## 2025-08-27 DIAGNOSIS — I25.810 CORONARY ARTERY DISEASE INVOLVING CORONARY BYPASS GRAFT OF NATIVE HEART WITHOUT ANGINA PECTORIS: ICD-10-CM

## 2025-08-27 RX ORDER — ROSUVASTATIN CALCIUM 20 MG/1
20 TABLET, COATED ORAL DAILY
Qty: 90 TABLET | Refills: 0 | Status: SHIPPED | OUTPATIENT
Start: 2025-08-27